# Patient Record
Sex: FEMALE | URBAN - METROPOLITAN AREA
[De-identification: names, ages, dates, MRNs, and addresses within clinical notes are randomized per-mention and may not be internally consistent; named-entity substitution may affect disease eponyms.]

---

## 2020-06-22 ENCOUNTER — APPOINTMENT (RX ONLY)
Dept: URBAN - METROPOLITAN AREA CLINIC 45 | Facility: CLINIC | Age: 35
Setting detail: DERMATOLOGY
End: 2020-06-22

## 2020-06-22 VITALS — TEMPERATURE: 98.7 F

## 2020-06-22 DIAGNOSIS — L50.1 IDIOPATHIC URTICARIA: ICD-10-CM

## 2020-06-22 PROCEDURE — ? TREATMENT REGIMEN

## 2020-06-22 PROCEDURE — ? INJECTION

## 2020-06-22 PROCEDURE — 96372 THER/PROPH/DIAG INJ SC/IM: CPT

## 2020-06-22 PROCEDURE — ? COUNSELING

## 2020-06-22 PROCEDURE — 99203 OFFICE O/P NEW LOW 30 MIN: CPT | Mod: 25

## 2020-06-22 PROCEDURE — ? PRESCRIPTION

## 2020-06-22 PROCEDURE — ? SEPARATE AND IDENTIFIABLE DOCUMENTATION

## 2020-06-22 RX ORDER — CIMETIDINE 400 MG/1
TABLET, FILM COATED ORAL
Qty: 60 | Refills: 0 | Status: ERX | COMMUNITY
Start: 2020-06-22

## 2020-06-22 RX ADMIN — CIMETIDINE: 400 TABLET, FILM COATED ORAL at 00:00

## 2020-06-22 ASSESSMENT — LOCATION SIMPLE DESCRIPTION DERM: LOCATION SIMPLE: RIGHT BUTTOCK

## 2020-06-22 ASSESSMENT — LOCATION DETAILED DESCRIPTION DERM: LOCATION DETAILED: RIGHT BUTTOCK

## 2020-06-22 ASSESSMENT — LOCATION ZONE DERM: LOCATION ZONE: TRUNK

## 2020-06-22 NOTE — PROCEDURE: INJECTION
Medication (2) And Associated J-Code Units: Dexamethasone Sodium Phosphate, 4mg
Dose Administered (Numbers Only): 4
Route: IM
Total Volume Injected In Cc (Will Not Affected Billing): 2
Medication (1) And Associated J-Code Units: Triamcinolone acetonide, 1mg
Dose Administered (Numbers Only): 40
Procedure Information: Please note that the numeric value listed in the Medication (1) and associated J-code units and Medication (2) and associated J-code units variables are j-code amounts and do not represent either the concentration or the total amount of the medications injected.  I strongly recommend selecting no to the Render J-code information in note question. This will allow your note to be more clear. If you are billing j-codes with your injection codes you need to document the total amount of the medication injected. This amount should match the j-code units. For example, if you are injecting Triamcinolone 40mg as an intramuscular injection you would select 40 for the dose field and mg for the units. This would allow you to document  with 4 units (40mg = 10mg x 4). The total volume is not used to calculate j-codes only the amount of the medication administered.
Post-Care Instructions: I reviewed with the patient in detail post-care instructions. Patient understands to keep the injection sites clean and call the clinic if there is any redness, swelling or pain.
Treatment Number: 0
Consent: The risks of the medication was reviewed with the patient.
Hide Second Medication?: No
Units: mg
Administered By (Optional): FERCHO Varghese
Detail Level: Zone
Bill J-Code: yes

## 2020-06-24 ENCOUNTER — APPOINTMENT (RX ONLY)
Dept: URBAN - METROPOLITAN AREA CLINIC 45 | Facility: CLINIC | Age: 35
Setting detail: DERMATOLOGY
End: 2020-06-24

## 2020-06-24 VITALS — TEMPERATURE: 99.1 F

## 2020-06-24 DIAGNOSIS — L50.1 IDIOPATHIC URTICARIA: ICD-10-CM | Status: WORSENING

## 2020-06-24 PROCEDURE — ? COUNSELING

## 2020-06-24 PROCEDURE — ? ORDER TESTS

## 2020-06-24 PROCEDURE — ? TREATMENT REGIMEN

## 2020-06-24 PROCEDURE — 99213 OFFICE O/P EST LOW 20 MIN: CPT

## 2020-06-24 PROCEDURE — ? PRESCRIPTION

## 2020-06-24 RX ORDER — DOXEPIN HYDROCHLORIDE 10 MG/1
CAPSULE ORAL
Qty: 30 | Refills: 0 | Status: ERX | COMMUNITY
Start: 2020-06-24

## 2020-06-24 RX ORDER — IVERMECTIN 3 MG/1
TABLET ORAL
Qty: 4 | Refills: 0 | Status: ERX | COMMUNITY
Start: 2020-06-24

## 2020-06-24 RX ADMIN — IVERMECTIN: 3 TABLET ORAL at 00:00

## 2020-06-24 RX ADMIN — DOXEPIN HYDROCHLORIDE: 10 CAPSULE ORAL at 00:00

## 2020-06-24 NOTE — PROCEDURE: ORDER TESTS
Bill For Surgical Tray: no
Performing Laboratory: [id number]
Billing Type: Third-Party Bill
Lab Facility: [id number]
Expected Date Of Service: 06/24/2020

## 2020-06-24 NOTE — PROCEDURE: TREATMENT REGIMEN
Continue Regimen: Xyzal 5mg in AM\\nCimetidine 400 mg bid\\nClobetasol cream and Betamethasone BID
Detail Level: Zone
Initiate Treatment: Doxepin 10 mg at HS\\nIvermectin 3mg take 4 tabs on set
Discontinue Regimen: Hydroxyzine
Plan: Continue fragrance free products and detergent, re-reviewed medications.\\n\\nDiscuss if no improvement consider re biopsy

## 2020-06-30 ENCOUNTER — APPOINTMENT (RX ONLY)
Dept: URBAN - METROPOLITAN AREA CLINIC 45 | Facility: CLINIC | Age: 35
Setting detail: DERMATOLOGY
End: 2020-06-30

## 2020-06-30 DIAGNOSIS — L50.1 IDIOPATHIC URTICARIA: ICD-10-CM

## 2020-06-30 PROCEDURE — ? PRESCRIPTION

## 2020-06-30 PROCEDURE — ? COUNSELING

## 2020-06-30 PROCEDURE — ? TREATMENT REGIMEN

## 2020-06-30 PROCEDURE — 99212 OFFICE O/P EST SF 10 MIN: CPT

## 2020-06-30 RX ORDER — CYCLOSPORINE 100 MG/1
CAPSULE, LIQUID FILLED ORAL
Qty: 20 | Refills: 0 | Status: ERX | COMMUNITY
Start: 2020-06-30

## 2020-06-30 RX ADMIN — CYCLOSPORINE: 100 CAPSULE, LIQUID FILLED ORAL at 00:00

## 2020-06-30 NOTE — PROCEDURE: TREATMENT REGIMEN
Continue Regimen: Xyzal 5mg in AM\\nCimetidine 400 mg bid\\nClobetasol cream and Betamethasone BID
Detail Level: Zone
Initiate Treatment: Doxepin 30 mg at HS\\n\\ncyclosporine modified 100 mg capsule \\nSig: Take one cap po bid x 10 days
Plan: Continue fragrance free products and detergent, re-reviewed medications.\\n\\nDiscuss if no improvement consider re biopsy\\n\\nInstructed to speak to prescriber of thyroid meds and see if they may d/c temporarily to see if that’s the cause. \\n\\nWill refer to allergist for evaluation. [facility] on [address] or [address]